# Patient Record
Sex: FEMALE | Race: WHITE
[De-identification: names, ages, dates, MRNs, and addresses within clinical notes are randomized per-mention and may not be internally consistent; named-entity substitution may affect disease eponyms.]

---

## 2018-03-16 ENCOUNTER — HOSPITAL ENCOUNTER (OUTPATIENT)
Dept: HOSPITAL 61 - SURG | Age: 73
Discharge: HOME | End: 2018-03-16
Attending: PODIATRIST
Payer: MEDICARE
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
## 2018-03-16 DIAGNOSIS — Z79.899: ICD-10-CM

## 2018-03-16 DIAGNOSIS — Z87.891: ICD-10-CM

## 2018-03-16 DIAGNOSIS — Z98.890: ICD-10-CM

## 2018-03-16 DIAGNOSIS — Z87.81: ICD-10-CM

## 2018-03-16 DIAGNOSIS — Z90.710: ICD-10-CM
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
## 2018-03-16 DIAGNOSIS — K21.9: ICD-10-CM
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
## 2018-03-16 DIAGNOSIS — E78.00: ICD-10-CM

## 2018-03-16 DIAGNOSIS — S92.332A: Primary | ICD-10-CM

## 2018-03-16 DIAGNOSIS — Z96.653: ICD-10-CM

## 2018-03-16 DIAGNOSIS — M19.90: ICD-10-CM

## 2018-03-16 LAB
ADD MAN DIFF?: NO
ANION GAP SERPL CALC-SCNC: 8 MMOL/L (ref 6–14)
BASO #: 0 X10^3/UL (ref 0–0.2)
BASO %: 1 % (ref 0–3)
BLOOD UREA NITROGEN: 21 MG/DL (ref 7–20)
CALCIUM: 9.8 MG/DL (ref 8.5–10.1)
CHLORIDE: 107 MMOL/L (ref 98–107)
CO2 SERPL-SCNC: 29 MMOL/L (ref 21–32)
CREAT SERPL-MCNC: 0.9 MG/DL (ref 0.6–1)
EOS #: 0.1 X10^3/UL (ref 0–0.7)
EOS %: 2 % (ref 0–3)
GFR SERPLBLD BASED ON 1.73 SQ M-ARVRAT: 61.9 ML/MIN
GLUCOSE SERPL-MCNC: 98 MG/DL (ref 70–99)
HCG SERPL-ACNC: 7 X10^3/UL (ref 4–11)
HEMATOCRIT: 39.8 % (ref 36–47)
HEMOGLOBIN: 13.2 G/DL (ref 12–15.5)
LYMPH #: 1.7 X10^3/UL (ref 1–4.8)
LYMPH %: 25 % (ref 24–48)
MEAN CORPUSCULAR HEMOGLOBIN: 30 PG (ref 25–35)
MEAN CORPUSCULAR HGB CONC: 33 G/DL (ref 31–37)
MEAN CORPUSCULAR VOLUME: 90 FL (ref 79–100)
MONO #: 0.7 X10^3/UL (ref 0–1.1)
MONO %: 10 % (ref 0–9)
NEUT #: 4.4 X10^3UL (ref 1.8–7.7)
NEUT %: 63 % (ref 31–73)
PLATELET COUNT: 248 X10^3/UL (ref 140–400)
POTASSIUM SERPL-SCNC: 4.8 MMOL/L (ref 3.5–5.1)
RED BLOOD COUNT: 4.45 X10^6/UL (ref 3.5–5.4)
RED CELL DISTRIBUTION WIDTH: 14.2 % (ref 11.5–14.5)
SODIUM: 144 MMOL/L (ref 136–145)

## 2018-03-16 PROCEDURE — 28485 OPTX METATARSAL FX EACH: CPT

## 2018-03-16 PROCEDURE — 73630 X-RAY EXAM OF FOOT: CPT

## 2018-03-16 PROCEDURE — 85025 COMPLETE CBC W/AUTO DIFF WBC: CPT

## 2018-03-16 PROCEDURE — 36415 COLL VENOUS BLD VENIPUNCTURE: CPT

## 2018-03-16 PROCEDURE — 80048 BASIC METABOLIC PNL TOTAL CA: CPT

## 2018-03-16 PROCEDURE — 97161 PT EVAL LOW COMPLEX 20 MIN: CPT

## 2018-03-16 RX ADMIN — SCOPOLAMINE 1 PATCH: 1 PATCH, EXTENDED RELEASE TRANSDERMAL at 11:28

## 2018-03-16 RX ADMIN — BUPIVACAINE HYDROCHLORIDE 1 ML: 5 INJECTION, SOLUTION EPIDURAL; INTRACAUDAL at 13:03

## 2018-03-16 RX ADMIN — LIDOCAINE HYDROCHLORIDE 1 ML: 10 INJECTION, SOLUTION EPIDURAL; INFILTRATION; INTRACAUDAL; PERINEURAL at 13:04

## 2018-03-16 RX ADMIN — LIDOCAINE HYDROCHLORIDE 1 ML: 10 INJECTION, SOLUTION EPIDURAL; INFILTRATION; INTRACAUDAL; PERINEURAL at 11:03

## 2018-03-16 RX ADMIN — SODIUM CHLORIDE, SODIUM LACTATE, POTASSIUM CHLORIDE, AND CALCIUM CHLORIDE 1 MLS/HR: .6; .31; .03; .02 INJECTION, SOLUTION INTRAVENOUS at 11:05

## 2022-03-31 NOTE — OP
DATE OF SURGERY: 03/31/2022

PREOPERATIVE DIAGNOSIS:   Symptomatic hardware, left 3rd metatarsal.



POSTOPERATIVE DIAGNOSIS:  Symptomatic hardware, left 3rd metatarsal.



PROCEDURE:  Excision of painful hardware, third metatarsal, left foot.



SURGEON:  Dr. Munira Nelson.



ANESTHESIA:  LMA with local ankle block.



HEMOSTASIS:  Left ankle tourniquet at 250 mmHg.



INDICATIONS:  The patient is a 72-year-old female who presented to clinic 

complaining of painful hardware to the left foot.  The patient had undergone an 

ORIF of a displaced fracture, third metatarsal on 03/16/2018.  She was healed 

uneventfully and has recently been having pain and shoe gear.  Thus discussed 

with the patient her risks, benefits and complications to include delayed or 

nonhealing, need for further surgery, infection, numbness, tingling, burning, 

chronic pain, DVT to the leg, pulmonary embolism, loss of toe, foot, limb or 

life.  All questions were answered.  No guarantees were made.  The patient 

signed consent freely and put in chart.



DESCRIPTION OF PROCEDURE:  The patient was transported to the operating room via

a cart and placed on the operating table in supine position.  She was given 1 

gram of IV Ancef preoperatively.  LMA was administered per anesthesia and I 

performed an ankle block to the left consisting of a 1:1 mixture of 1% lidocaine

plain and 0.5% Marcaine plain, 20 mL total.  The left foot was then prepped and 

draped in the usual aseptic manner.  A well-padded tourniquet was placed over 

the left ankle.  An Esmarch bandage was used to exsanguinate the left foot.  The

left ankle tourniquet was inflated to 250 mmHg.  Timeout was taken to verify the

patient's surgery and limb to be performed.  Attention was directed to the third

metatarsal where a 5-cm incision was made just dorsal to the third metatarsal 

where we palpated the hardware.  This was deepened to the level of the 

metatarsal.  Small vessels were retracted from the field and retracted the 

extensor tendon from covering the hardware.  Next, this was deepened to the 

level of the hardware and joint capsule.  There was one 3-0 fully threaded 

cortical screw removed and then three 3.0 locking screws fully threaded cortical

screws.  We removed the 5-hole plate over the metatarsal and the wound was 

copiously irrigated with sterile saline and next the skin was reapproximated 

with 3-0 Vicryl and 4-0 nylon.  A postop injection was given with 4 mg/mL of 

Decadron 1 mL total and the wound was dressed with bacitracin, Adaptic gauze, 4 

x 4s, Kerlix and Ace bandage.  The tourniquet was deflated and good perfusion 

was noted to all digits of the left foot.  The patient is to be minimal 

weightbearing as tolerated in a surgical shoe.  She is to return to office for 

wound check in one to two weeks.  She is to begin ibuprofen 800 mg t.i.d. p.r.n.

pain.







LULI

DR: Jeanette   DD: 03/31/2022 12:36

DT: 03/31/2022 12:57   TID: [id number]

## 2022-03-31 NOTE — PDOC1
History and Physical


Date of Admission


Date of Admission


DATE: 3/31/22 


TIME: 10:45





Identification/Chief Complaint


Chief Complaint


Left third metatarsal pain





Source


Source:  Patient





History of Present Illness


History of Present Illness


Patient is a 76-year-old female with past medical history CAD, osteoarthritis, 

who presents today for outpatient ambulatory podiatry surgery.  She is scheduled

to have removal of hardware from her third metatarsal on her left foot.  

Hardware has been present for the past 4 years as result of a foot fracture.  As

of late the hardware has been irritating the top of her foot, so she is s

cheduled for removal.  Four years ago her fracture was fixed by Dr. Nelson, 

and today Dr. Nelson will also be performing her surgical removal.





Past Medical History


Cardiovascular:  Hyperlipidemia


Pulmonary:  No pertinent hx


GI:  No pertinent hx


Hepatobiliary:  No pertinent hx


Psych:  No pertinent hx


Musculoskeletal:  Osteoarthritis





Past Surgical History


Past Surgical History


Tonsillectomy, adenoidectomy, left foot surgery, bilateral knee surgery, 

hysterectomy, appendectomy


Past Surgical History:  Appendectomy, Tonsillectomy





Family History


Family History


Prostate cancer, lung cancer


Family History:  No Significant





Social History


Smoke:  Quit


ALCOHOL:  rare


Drugs:  None





Current Medications


Current Medications





Current Medications


Fentanyl Citrate (Fentanyl 2ml Vial) 25 mcg PRN Q5MIN  PRN IVP MILD PAIN 1-3;  

Start 3/31/22 at 06:00;  Stop 4/1/22 at 05:59


Fentanyl Citrate (Fentanyl 2ml Vial) 50 mcg PRN Q5MIN  PRN IVP MODERATE PAIN 4-

6;  Start 3/31/22 at 06:00;  Stop 4/1/22 at 05:59


Morphine Sulfate (Morphine Sulfate) 1 mg PRN Q10MIN  PRN IVP SEVERE PAIN 7-10;  

Start 3/31/22 at 06:00;  Stop 4/1/22 at 05:59


Ringer's Solution 1,000 ml @  30 mls/hr Q24H IV ;  Start 3/31/22 at 06:00;  Stop

3/31/22 at 17:59


Hydromorphone HCl (Dilaudid) 0.5 mg PRN Q10MIN  PRN IVP SEVERE PAIN 7-10, 2nd 

CHOICE;  Start 3/31/22 at 06:00;  Stop 4/1/22 at 05:59


Prochlorperazine Edisylate (Compazine) 5 mg PACU PRN  PRN IVP NAUSEA, MRX1;  

Start 3/31/22 at 06:00;  Stop 4/1/22 at 05:59


Cefazolin Sodium (Ancef) 1 gm 1X PREOP  PRN IVP PRIOR TO PROCEDURE;  Start 

3/31/22 at 06:00


Propofol (Diprivan) 200 mg STK-MED ONCE IV ;  Start 3/31/22 at 09:59;  Stop 

3/31/22 at 09:59;  Status DC


Ondansetron HCl (Zofran) 4 mg STK-MED ONCE .ROUTE ;  Start 3/31/22 at 09:59;  

Stop 3/31/22 at 09:59;  Status DC


Dexamethasone Sodium Phosphate (Decadron) 4 mg STK-MED ONCE .ROUTE ;  Start 

3/31/22 at 09:59;  Stop 3/31/22 at 09:59;  Status DC


Lidocaine HCl (Xylocaine-Mpf 1% 5ml Vial) 5 ml STK-MED ONCE .ROUTE ;  Start 

3/31/22 at 09:59;  Stop 3/31/22 at 09:59;  Status DC


Fentanyl Citrate (Fentanyl 2ml Vial) 100 mcg STK-MED ONCE .ROUTE ;  Start 

3/31/22 at 10:00;  Stop 3/31/22 at 10:00;  Status DC





Active Scripts


Active


Reported


Celebrex (Celecoxib) 100 Mg Capsule 100 Mg PO HS 30 Days


Omeprazole 20 Mg Capsule.dr 20 Mg PO PRN PRN


Tizanidine Hcl 4 Mg Tablet 2 Mg PO HS PRN


Gabapentin ** (Gabapentin) 100 Mg Capsule 100 Mg PO TID


Calcium (Calcium Carbonate) 500 Mg Tablet 1,000 Mg PO DAILY


Simvastatin 20 Mg Tablet 20 Mg PO HS


     LAST DOSE GIVEN:


     DATE: 3/24


     TIME: 9 pm


     NEXT DOSE DUE:


     DATE: 3/25


     TIME: 9 pm





Allergies


Allergies:  


Coded Allergies:  


     No Known Drug Allergies (Unverified , 3/31/22)





ROS


Review of System


GENERAL:  No history of weight change, weakness or fevers.


SKIN:  No bruising, hair changes or rashes.


EYES:  No blurred, double or loss of vision.


NOSE AND THROAT:  No history of nosebleeds, hoarseness or sore throat.


HEART:  Denies chest pain, denies palpitations.


LUNGS:  Denies cough, hemoptysis, wheezing or shortness of breath.


GASTROINTESTINAL: Denies nausea, vomiting, abdominal pain.


GENITOURINARY: Denies dysuria, frequency, urgency, hematuria.


NEUROLOGIC:  Denies history of numbness, tingling, tremor or weakness.


PSYCHIATRIC: Denies anxiety, denies depression.


ENDOCRINE:  No history of heat or cold intolerance, polyuria or polydipsia.


EXTREMITIES: Left third toe pain.  Denies muscle weakness or stiffness.





Physical Exam


Physical Exam


General:  Alert, Oriented X3, Cooperative, No acute distress


HEENT:  PERRLA, EOMI


Lungs:  Clear to auscultation, Normal air movement


Heart:  RRR, no murmurs


Cardiovascular:  S1, S2


Abdomen:  Normal bowel sounds, Soft, No tenderness


Extremities: Left third metatarsal in flexed position.  No clubbing, No 

cyanosis.


Skin:  No rashes, No significant lesion


Neuro:  Normal speech, Normal tone, Sensation intact


Psych/Mental Status:  Mental status NL, Mood NL





Vitals


Vitals





Vital Signs








  Date Time  Temp Pulse Resp B/P (MAP) Pulse Ox O2 Delivery O2 Flow Rate FiO2


 


3/31/22 10:33 97.0 69 20  98   





 97.0       











VTE Prophylaxis Ordered


VTE Prophylaxis Devices:  Yes


VTE Pharmacological Prophylaxi:  No





Assessment/Plan


Assessment/Plan


Hardware removal third metatarsal of left foot


Osteoarthritis





Plan:


Outpatient amatory surgery and minimal blood loss expected


Barring complications patient will most likely discharge home with  after

the surgical removal of her hardware


FEN - Cardiac diet


PPX  - Ambulatory


FULL CODE


Dispo - Home with spouse





Justifications for Admission


Other Justification














BRYANT RIVAS MD            Mar 31, 2022 10:57

## 2022-03-31 NOTE — PDOC4
OPERATIVE NOTE


Date:


Date:  Mar 31, 2022





Pre-Op Diagnosis:


symptomatic hardware left 3rd metatarsal





Post-Op Diagnosis:


same





Procedure Performed:


excision of hardware 3rd metatarsal left foot





Surgeon:


Omar





Anesthesia Type:


LMA with local





Blood Loss:


0mL





Specimans Obtained:


none





Findings:


hardware to 3rd metatarsal with no signs of infection or fractures





Complications:


none





Operative Note:


Patient tolerated both anesthesia and procedure well, tranferred to PACU with 

VSS adn VSI to left foot











HOSSEIN DUARTE DPM         Mar 31, 2022 12:27

## 2022-04-01 NOTE — RAD
EXAM:  XR FOOT_LEFT 3 VIEWS 3/31/2022 12:27 PM



CLINICAL INDICATION:  Postop



COMPARISON:  Left foot radiograph 3/16/2018



TECHNIQUE:  3 views of the left foot



FINDINGS:  There has been interval removal of the third metatarsal fixation plate and screws. The hea
led third metatarsal fractures are unchanged in appearance with a chronic plantarly displaced fragmen
t. Fractures of the base of the second and fourth metatarsals have also healed There is no acute frac
ture. Alignment is normal and there is soft tissue gas along the dorsal forefoot.



IMPRESSION:

1. Interval removal of third metatarsal hardware.

2. Interval healing of second through fourth metatarsal fractures, unchanged in alignment.



Electronically signed by: Lacy Tran MD (4/1/2022 11:20 AM) [id number]